# Patient Record
Sex: FEMALE | Race: WHITE | NOT HISPANIC OR LATINO | Employment: FULL TIME | URBAN - NONMETROPOLITAN AREA
[De-identification: names, ages, dates, MRNs, and addresses within clinical notes are randomized per-mention and may not be internally consistent; named-entity substitution may affect disease eponyms.]

---

## 2024-12-10 ENCOUNTER — APPOINTMENT (OUTPATIENT)
Dept: RADIOLOGY | Facility: HOSPITAL | Age: 34
End: 2024-12-10
Payer: COMMERCIAL

## 2024-12-10 ENCOUNTER — HOSPITAL ENCOUNTER (EMERGENCY)
Facility: HOSPITAL | Age: 34
Discharge: HOME | End: 2024-12-11
Attending: EMERGENCY MEDICINE
Payer: COMMERCIAL

## 2024-12-10 ENCOUNTER — HOSPITAL ENCOUNTER (OUTPATIENT)
Dept: CARDIOLOGY | Facility: HOSPITAL | Age: 34
Discharge: HOME | End: 2024-12-10
Payer: COMMERCIAL

## 2024-12-10 DIAGNOSIS — R07.9 ACUTE CHEST PAIN: Primary | ICD-10-CM

## 2024-12-10 DIAGNOSIS — R19.7 DIARRHEA, UNSPECIFIED TYPE: ICD-10-CM

## 2024-12-10 DIAGNOSIS — E86.0 DEHYDRATION: ICD-10-CM

## 2024-12-10 LAB
ALBUMIN SERPL BCP-MCNC: 4.2 G/DL (ref 3.4–5)
ALP SERPL-CCNC: 72 U/L (ref 33–110)
ALT SERPL W P-5'-P-CCNC: 21 U/L (ref 7–45)
ANION GAP SERPL CALC-SCNC: 13 MMOL/L (ref 10–20)
AST SERPL W P-5'-P-CCNC: 19 U/L (ref 9–39)
B-HCG SERPL-ACNC: <2 MIU/ML
BASOPHILS # BLD AUTO: 0.02 X10*3/UL (ref 0–0.1)
BASOPHILS NFR BLD AUTO: 0.4 %
BILIRUB SERPL-MCNC: 0.4 MG/DL (ref 0–1.2)
BUN SERPL-MCNC: 14 MG/DL (ref 6–23)
CALCIUM SERPL-MCNC: 8.4 MG/DL (ref 8.6–10.3)
CARDIAC TROPONIN I PNL SERPL HS: <3 NG/L (ref 0–13)
CARDIAC TROPONIN I PNL SERPL HS: <3 NG/L (ref 0–13)
CHLORIDE SERPL-SCNC: 102 MMOL/L (ref 98–107)
CO2 SERPL-SCNC: 22 MMOL/L (ref 21–32)
CREAT SERPL-MCNC: 0.98 MG/DL (ref 0.5–1.05)
D DIMER PPP FEU-MCNC: 3369 NG/ML FEU
EGFRCR SERPLBLD CKD-EPI 2021: 78 ML/MIN/1.73M*2
EOSINOPHIL # BLD AUTO: 0 X10*3/UL (ref 0–0.7)
EOSINOPHIL NFR BLD AUTO: 0 %
ERYTHROCYTE [DISTWIDTH] IN BLOOD BY AUTOMATED COUNT: 12.3 % (ref 11.5–14.5)
GLUCOSE SERPL-MCNC: 101 MG/DL (ref 74–99)
HCT VFR BLD AUTO: 41.9 % (ref 36–46)
HGB BLD-MCNC: 14 G/DL (ref 12–16)
IMM GRANULOCYTES # BLD AUTO: 0.02 X10*3/UL (ref 0–0.7)
IMM GRANULOCYTES NFR BLD AUTO: 0.4 % (ref 0–0.9)
LYMPHOCYTES # BLD AUTO: 1.09 X10*3/UL (ref 1.2–4.8)
LYMPHOCYTES NFR BLD AUTO: 22.2 %
MAGNESIUM SERPL-MCNC: 1.99 MG/DL (ref 1.6–2.4)
MCH RBC QN AUTO: 28.6 PG (ref 26–34)
MCHC RBC AUTO-ENTMCNC: 33.4 G/DL (ref 32–36)
MCV RBC AUTO: 86 FL (ref 80–100)
MONOCYTES # BLD AUTO: 0.57 X10*3/UL (ref 0.1–1)
MONOCYTES NFR BLD AUTO: 11.6 %
NEUTROPHILS # BLD AUTO: 3.22 X10*3/UL (ref 1.2–7.7)
NEUTROPHILS NFR BLD AUTO: 65.4 %
NRBC BLD-RTO: 0 /100 WBCS (ref 0–0)
PLATELET # BLD AUTO: 213 X10*3/UL (ref 150–450)
POTASSIUM SERPL-SCNC: 3.8 MMOL/L (ref 3.5–5.3)
PROT SERPL-MCNC: 7.1 G/DL (ref 6.4–8.2)
RBC # BLD AUTO: 4.9 X10*6/UL (ref 4–5.2)
SODIUM SERPL-SCNC: 133 MMOL/L (ref 136–145)
WBC # BLD AUTO: 4.9 X10*3/UL (ref 4.4–11.3)

## 2024-12-10 PROCEDURE — 93005 ELECTROCARDIOGRAM TRACING: CPT

## 2024-12-10 PROCEDURE — 99285 EMERGENCY DEPT VISIT HI MDM: CPT | Mod: 25 | Performed by: EMERGENCY MEDICINE

## 2024-12-10 PROCEDURE — 96360 HYDRATION IV INFUSION INIT: CPT | Mod: 59

## 2024-12-10 PROCEDURE — 85025 COMPLETE CBC W/AUTO DIFF WBC: CPT | Performed by: EMERGENCY MEDICINE

## 2024-12-10 PROCEDURE — 96361 HYDRATE IV INFUSION ADD-ON: CPT

## 2024-12-10 PROCEDURE — 80053 COMPREHEN METABOLIC PANEL: CPT | Performed by: EMERGENCY MEDICINE

## 2024-12-10 PROCEDURE — 71275 CT ANGIOGRAPHY CHEST: CPT | Performed by: RADIOLOGY

## 2024-12-10 PROCEDURE — 85379 FIBRIN DEGRADATION QUANT: CPT | Performed by: EMERGENCY MEDICINE

## 2024-12-10 PROCEDURE — 71045 X-RAY EXAM CHEST 1 VIEW: CPT | Performed by: RADIOLOGY

## 2024-12-10 PROCEDURE — 2550000001 HC RX 255 CONTRASTS: Performed by: EMERGENCY MEDICINE

## 2024-12-10 PROCEDURE — 71045 X-RAY EXAM CHEST 1 VIEW: CPT

## 2024-12-10 PROCEDURE — 71275 CT ANGIOGRAPHY CHEST: CPT

## 2024-12-10 PROCEDURE — 83735 ASSAY OF MAGNESIUM: CPT | Performed by: EMERGENCY MEDICINE

## 2024-12-10 PROCEDURE — 84484 ASSAY OF TROPONIN QUANT: CPT | Performed by: EMERGENCY MEDICINE

## 2024-12-10 PROCEDURE — 36415 COLL VENOUS BLD VENIPUNCTURE: CPT | Performed by: EMERGENCY MEDICINE

## 2024-12-10 PROCEDURE — 84702 CHORIONIC GONADOTROPIN TEST: CPT | Performed by: EMERGENCY MEDICINE

## 2024-12-10 PROCEDURE — 2500000004 HC RX 250 GENERAL PHARMACY W/ HCPCS (ALT 636 FOR OP/ED): Performed by: EMERGENCY MEDICINE

## 2024-12-10 RX ADMIN — SODIUM CHLORIDE 1000 ML: 9 INJECTION, SOLUTION INTRAVENOUS at 21:53

## 2024-12-10 RX ADMIN — IOHEXOL 68 ML: 350 INJECTION, SOLUTION INTRAVENOUS at 23:18

## 2024-12-10 ASSESSMENT — COLUMBIA-SUICIDE SEVERITY RATING SCALE - C-SSRS
1. IN THE PAST MONTH, HAVE YOU WISHED YOU WERE DEAD OR WISHED YOU COULD GO TO SLEEP AND NOT WAKE UP?: NO
2. HAVE YOU ACTUALLY HAD ANY THOUGHTS OF KILLING YOURSELF?: NO
6. HAVE YOU EVER DONE ANYTHING, STARTED TO DO ANYTHING, OR PREPARED TO DO ANYTHING TO END YOUR LIFE?: NO

## 2024-12-10 ASSESSMENT — PAIN DESCRIPTION - LOCATION: LOCATION: CHEST

## 2024-12-10 ASSESSMENT — PAIN DESCRIPTION - PAIN TYPE: TYPE: ACUTE PAIN

## 2024-12-10 ASSESSMENT — PAIN - FUNCTIONAL ASSESSMENT: PAIN_FUNCTIONAL_ASSESSMENT: 0-10

## 2024-12-10 ASSESSMENT — VISUAL ACUITY: OU: 1

## 2024-12-10 ASSESSMENT — PAIN SCALES - GENERAL: PAINLEVEL_OUTOF10: 7

## 2024-12-11 VITALS
DIASTOLIC BLOOD PRESSURE: 68 MMHG | WEIGHT: 178 LBS | HEART RATE: 84 BPM | RESPIRATION RATE: 16 BRPM | OXYGEN SATURATION: 98 % | TEMPERATURE: 97.2 F | HEIGHT: 63 IN | BODY MASS INDEX: 31.54 KG/M2 | SYSTOLIC BLOOD PRESSURE: 110 MMHG

## 2024-12-11 ASSESSMENT — PAIN DESCRIPTION - PAIN TYPE: TYPE: ACUTE PAIN

## 2024-12-11 ASSESSMENT — PAIN SCALES - GENERAL: PAINLEVEL_OUTOF10: 7

## 2024-12-11 NOTE — ED PROVIDER NOTES
Chest pain.  This 34-year-old white female presents to the ED with complaint of chest pain symptoms that started yesterday she states that initially they were continuous but today her chest pain symptoms have been continuous.  She states that she is also noted that her heart rate has been high up to 147 bpm.  She describes the pain as a sharp stabbing pain in the left side of her chest she does admit to a family history with her father having MI age 47.  She states that over the weekend she did have food poisoning with diarrhea.  Patient states anytime she tries to eat or drink anything goes right through her.  She denies any abdominal pain.  She denies any leg pain or swelling.  She denies any risk factors for heart disease other than family history.  She is admits to being allergic to penicillin.      History provided by:  Patient   used: No         Physical Exam  Vitals and nursing note reviewed.   Constitutional:       General: She is awake.      Appearance: Normal appearance. She is overweight.   HENT:      Head: Normocephalic and atraumatic.      Right Ear: Hearing and external ear normal.      Left Ear: Hearing and external ear normal.      Nose: Nose normal. No congestion or rhinorrhea.      Mouth/Throat:      Lips: Pink.      Mouth: Mucous membranes are moist.      Pharynx: Oropharynx is clear. Uvula midline. No oropharyngeal exudate or posterior oropharyngeal erythema.   Eyes:      General: Lids are normal. Vision grossly intact.         Right eye: No discharge.         Left eye: No discharge.      Extraocular Movements: Extraocular movements intact.      Conjunctiva/sclera: Conjunctivae normal.      Pupils: Pupils are equal, round, and reactive to light.   Cardiovascular:      Rate and Rhythm: Regular rhythm. Tachycardia present.      Pulses: Normal pulses.      Heart sounds: Normal heart sounds. No murmur heard.     No friction rub. No gallop.   Pulmonary:      Effort: Pulmonary  effort is normal. No respiratory distress.      Breath sounds: Normal breath sounds. No stridor. No wheezing, rhonchi or rales.   Chest:      Chest wall: No tenderness.   Abdominal:      General: Abdomen is protuberant. Bowel sounds are normal. There is no distension.      Palpations: Abdomen is soft. There is no mass.      Tenderness: There is no abdominal tenderness. There is no guarding or rebound.      Hernia: No hernia is present.      Comments: Patient has a benign abdominal exam.   Musculoskeletal:         General: No swelling, tenderness, deformity or signs of injury. Normal range of motion.      Cervical back: Full passive range of motion without pain, normal range of motion and neck supple.      Right lower leg: Normal. No edema.      Left lower leg: Normal. No edema.   Skin:     General: Skin is warm and dry.      Capillary Refill: Capillary refill takes less than 2 seconds.      Coloration: Skin is not jaundiced or pale.      Findings: No bruising, erythema, lesion or rash.   Neurological:      General: No focal deficit present.      Mental Status: She is alert and oriented to person, place, and time.      GCS: GCS eye subscore is 4. GCS verbal subscore is 5. GCS motor subscore is 6.      Cranial Nerves: Cranial nerves 2-12 are intact. No cranial nerve deficit.      Sensory: Sensation is intact. No sensory deficit.      Motor: Motor function is intact. No weakness.      Coordination: Coordination is intact. Coordination normal.      Gait: Gait is intact.      Deep Tendon Reflexes: Reflexes normal.   Psychiatric:         Attention and Perception: Attention and perception normal.         Mood and Affect: Mood and affect normal.         Speech: Speech normal.         Behavior: Behavior normal. Behavior is cooperative.         Thought Content: Thought content normal.         Cognition and Memory: Cognition and memory normal.         Judgment: Judgment normal.          Labs Reviewed   CBC WITH AUTO  DIFFERENTIAL - Abnormal       Result Value    WBC 4.9      nRBC 0.0      RBC 4.90      Hemoglobin 14.0      Hematocrit 41.9      MCV 86      MCH 28.6      MCHC 33.4      RDW 12.3      Platelets 213      Neutrophils % 65.4      Immature Granulocytes %, Automated 0.4      Lymphocytes % 22.2      Monocytes % 11.6      Eosinophils % 0.0      Basophils % 0.4      Neutrophils Absolute 3.22      Immature Granulocytes Absolute, Automated 0.02      Lymphocytes Absolute 1.09 (*)     Monocytes Absolute 0.57      Eosinophils Absolute 0.00      Basophils Absolute 0.02     COMPREHENSIVE METABOLIC PANEL - Abnormal    Glucose 101 (*)     Sodium 133 (*)     Potassium 3.8      Chloride 102      Bicarbonate 22      Anion Gap 13      Urea Nitrogen 14      Creatinine 0.98      eGFR 78      Calcium 8.4 (*)     Albumin 4.2      Alkaline Phosphatase 72      Total Protein 7.1      AST 19      Bilirubin, Total 0.4      ALT 21     D-DIMER, VTE EXCLUSION - Abnormal    D-Dimer, Quantitative VTE Exclusion 3,369 (*)     Narrative:     The VTE Exclusion D-Dimer assay is reported in ng/mL Fibrinogen Equivalent Units (FEU).    Per 's instructions for use, a value of less than 500 ng/mL (FEU) may help to exclude DVT or PE in outpatients when the assay is used with a clinical pretest probability assessment.([facility] must utilize and document eCalc 'Wells Score Deep Vein Thrombosis Risk' for DVT exclusion only. Emergency Department should utilize  Guidelines for Emergency Department Use of the VTE Exclusion D-Dimer and Clinical Pretest probability assessment model for DVT or PE exclusion.)   MAGNESIUM - Normal    Magnesium 1.99     HUMAN CHORIONIC GONADOTROPIN, SERUM QUANTITATIVE - Normal    HCG, Beta-Quantitative <2      Narrative:      Total HCG measurement is performed using the [employer] Access   Immunoassay which detects intact HCG and free beta HCG subunit.    This test is not indicated for use as a tumor marker.   HCG testing is  performed using a different test methodology at [facility] than other [facility]. Direct result comparison   should only be made within the same method.       SERIAL TROPONIN-INITIAL - Normal    Troponin I, High Sensitivity <3      Narrative:     Less than 99th percentile of normal range cutoff-  Female and children under 18 years old <14 ng/L; Male <21 ng/L: Negative  Repeat testing should be performed if clinically indicated.     Female and children under 18 years old 14-50 ng/L; Male 21-50 ng/L:  Consistent with possible cardiac damage and possible increased clinical   risk. Serial measurements may help to assess extent of myocardial damage.     >50 ng/L: Consistent with cardiac damage, increased clinical risk and  myocardial infarction. Serial measurements may help assess extent of   myocardial damage.      NOTE: Children less than 1 year old may have higher baseline troponin   levels and results should be interpreted in conjunction with the overall   clinical context.     NOTE: Troponin I testing is performed using a different   testing methodology at [facility] than at [facility]. Direct result comparisons should only   be made within the same method.   SERIAL TROPONIN, 1 HOUR - Normal    Troponin I, High Sensitivity <3      Narrative:     Less than 99th percentile of normal range cutoff-  Female and children under 18 years old <14 ng/L; Male <21 ng/L: Negative  Repeat testing should be performed if clinically indicated.     Female and children under 18 years old 14-50 ng/L; Male 21-50 ng/L:  Consistent with possible cardiac damage and possible increased clinical   risk. Serial measurements may help to assess extent of myocardial damage.     >50 ng/L: Consistent with cardiac damage, increased clinical risk and  myocardial infarction. Serial measurements may help assess extent of   myocardial damage.      NOTE: Children less than 1 year old may have higher  baseline troponin   levels and results should be interpreted in conjunction with the overall   clinical context.     NOTE: Troponin I testing is performed using a different   testing methodology at [facility] than at other   [facility] hospitals. Direct result comparisons should only   be made within the same method.   TROPONIN SERIES- (INITIAL, 1 HR)    Narrative:     The following orders were created for panel order Troponin I Series, High Sensitivity (0, 1 HR).  Procedure                               Abnormality         Status                     ---------                               -----------         ------                     Troponin I, High Sensiti...[[id number]]  Normal              Final result               Troponin, High Sensitivi...[[id number]]  Normal              Final result                 Please view results for these tests on the individual orders.        CT angio chest for pulmonary embolism   Final Result   No pulmonary embolism or acute cardiopulmonary process.             MACRO:   None        Signed by: Jade Pierson 12/10/2024 11:40 PM   Dictation workstation:   [id number]      XR chest 1 view   Final Result   No acute cardiopulmonary process is evident.        MACRO:   None        Signed by: Ruben Peña 12/10/2024 10:02 PM   Dictation workstation:   [id number]           Procedures     Medical Decision Making  Patient was seen and evaluated due to complaints of chest pain symptoms that started after she noted that her heart rate was up.  She states that she does have a family history of heart attack her father age 47.  She describes her chest pain as sharp and stabbing.  Patient states that over the weekend she had developed diarrhea and continues to have loose stools with ED.  Patient was ordered EKGs that revealed no acute ischemic changes.  The patient's comprehensive metabolic panel revealed glucose of 101 with a sodium of 133 and calcium of 8.4.  Pregnancy test was  negative.  Magnesium was normal at 1.99.  CBC revealed a normal white cell count 4.9.  Hemoglobin was normal at 14 hematocrit was normal at 41.9.  Platelets were normal at 2 and 13.  D-dimer was very high at 2269.  Initial troponin was less than 3 and repeat was less than 3 repeat EKG revealed no ischemic changes.  Initial chest x-ray revealed no acute disease process subsequently a CTA of the chest was ordered due to the elevation of the D-dimer and this revealed no pulmonary embolism or acute cardiopulmonary process.  The patient was treated with a liter of normal saline wide open during the course of the ED visit due to history of diarrhea she was then discharged home in stable satisfactory condition and referred back to her primary care doctor for follow-up.    Amount and/or Complexity of Data Reviewed  ECG/medicine tests: independent interpretation performed.     Details: EKG was interpreted by myself at 9:45 PM.  EKG reveals sinus tachycardia 104 bpm with no acute ST or T wave changes noted.  The heart rate is 120 bpm.  The QRS duration 70 ms.  The QTc is 460 ms.  Axis is 3 degrees. No evidence of STEMI.  Repeat EKG was interpreted by myself at 10:51 PM reveals normal sinus rhythm with Q waves in V1.  The heart rate is 85 bpm.  The CT interval is 132 ms.  The QRS duration 76 ms.  The QTc is 473 ms.  Axis is 12 degrees.  No evidence of STEMI noted.         Diagnoses as of 12/11/24 0506   Acute chest pain   Dehydration   Diarrhea, unspecified type                    Ruben Ding,   12/11/24 0506

## 2024-12-13 LAB
ATRIAL RATE: 104 BPM
ATRIAL RATE: 85 BPM
P AXIS: 10 DEGREES
P AXIS: 14 DEGREES
P OFFSET: 201 MS
P OFFSET: 203 MS
P ONSET: 157 MS
P ONSET: 165 MS
PR INTERVAL: 120 MS
PR INTERVAL: 132 MS
Q ONSET: 223 MS
Q ONSET: 225 MS
QRS COUNT: 14 BEATS
QRS COUNT: 17 BEATS
QRS DURATION: 70 MS
QRS DURATION: 76 MS
QT INTERVAL: 350 MS
QT INTERVAL: 398 MS
QTC CALCULATION(BAZETT): 460 MS
QTC CALCULATION(BAZETT): 473 MS
QTC FREDERICIA: 420 MS
QTC FREDERICIA: 447 MS
R AXIS: 12 DEGREES
R AXIS: 3 DEGREES
T AXIS: 24 DEGREES
T AXIS: 25 DEGREES
T OFFSET: 400 MS
T OFFSET: 422 MS
VENTRICULAR RATE: 104 BPM
VENTRICULAR RATE: 85 BPM